# Patient Record
Sex: FEMALE | Race: WHITE | NOT HISPANIC OR LATINO | Employment: FULL TIME | URBAN - METROPOLITAN AREA
[De-identification: names, ages, dates, MRNs, and addresses within clinical notes are randomized per-mention and may not be internally consistent; named-entity substitution may affect disease eponyms.]

---

## 2017-11-28 ENCOUNTER — OFFICE VISIT (OUTPATIENT)
Dept: URGENT CARE | Facility: MEDICAL CENTER | Age: 22
End: 2017-11-28
Payer: COMMERCIAL

## 2017-11-29 NOTE — PROGRESS NOTES
Assessment    1  's permit physical examination (V70 3) (Z02 4)    Discussion/Summary  Discussion Summary:   Patient deemed fit for learners permit physical  paperwork signed, copy, original given back to patient  Chief Complaint  Chief Complaint Free Text Note Form: Here for  permit physical      History of Present Illness  HPI: Patient presents for a loner permit physical  She has no complaints at this time  No acute distress  Hospital Based Practices Required Assessment:  Pain Assessment  the patient states they do not have pain  Abuse And Domestic Violence Screen   Yes, the patient is safe at home  -- The patient states no one is hurting them  Depression And Suicide Screen  No, the patient has not had thoughts of hurting themself  No, the patient has not felt depressed in the past 7 days  Prefered Language is  english  Primary Language is  english  Readiness To Learn: Receptive  Barriers To Learning: none  Preferred Learning: verbal      Review of Systems  Focused-Female:  Constitutional: no fever-- and-- no chills  ENT: no nosebleeds-- and-- no nasal discharge  Cardiovascular: no chest pain-- and-- no palpitations  Respiratory: no shortness of breath,-- no cough-- and-- no wheezing  Gastrointestinal: no abdominal pain,-- no nausea,-- no vomiting-- and-- no diarrhea  ROS Reviewed:   ROS reviewed  Active Problems  1  Birth control (V25 9) (Z30 9)    Past Medical History  Active Problems And Past Medical History Reviewed: The active problems and past medical history were reviewed and updated today  Family History  Father    1  Family history of Back pain  Family History Reviewed: The family history was reviewed and updated today  Social History   · Former smoker (C31 17) (O39 037)  Social History Reviewed: The social history was reviewed and updated today  The social history was reviewed and is unchanged  Surgical History  Surgical History Reviewed:    The surgical history was reviewed and updated today  Current Meds   1  No Reported Medications Recorded  Medication List Reviewed: The medication list was reviewed and updated today  Allergies  1  No Known Drug Allergies    2  No Known Environmental Allergies   3  No Known Food Allergies    Vitals  Signs   Recorded: [id number] 10:37AM   Temperature: 97 8 F  Heart Rate: 71  Respiration: 18  Systolic: 306  Diastolic: 59  Weight: 323 lb   O2 Saturation: 100  Pain Scale: 0    Physical Exam   Constitutional  General appearance: No acute distress, well appearing and well nourished  Eyes  Conjunctiva and lids: No swelling, erythema or discharge  Pupils and irises: Equal, round and reactive to light  Ears, Nose, Mouth, and Throat  External inspection of ears and nose: Normal    Otoscopic examination: Tympanic membranes translucent with normal light reflex  Canals patent without erythema  Nasal mucosa, septum, and turbinates: Normal without edema or erythema  Oropharynx: Normal with no erythema, edema, exudate or lesions  Pulmonary  Respiratory effort: No increased work of breathing or signs of respiratory distress  Auscultation of lungs: Clear to auscultation  Cardiovascular  Palpation of heart: Normal PMI, no thrills  Auscultation of heart: Normal rate and rhythm, normal S1 and S2, without murmurs  Examination of extremities for edema and/or varicosities: Normal    Abdomen  Abdomen: Non-tender, no masses  Lymphatic  Palpation of lymph nodes in neck: No lymphadenopathy  Musculoskeletal  Gait and station: Normal    Inspection/palpation of joints, bones, and muscles: Normal    Skin  Skin and subcutaneous tissue: Normal without rashes or lesions  Neurologic  Reflexes: 2+ and symmetric  Sensation: No sensory loss     Psychiatric  Orientation to person, place, and time: Normal    Mood and affect: Normal        Signatures   Electronically signed by : Drea Browne [facility]; Nov 28 2017 10:59AM EST (Author)    Electronically signed by : ADALBERTO Damon ; Nov 28 2017 11:24AM EST

## 2021-12-05 ENCOUNTER — APPOINTMENT (OUTPATIENT)
Dept: RADIOLOGY | Facility: MEDICAL CENTER | Age: 26
End: 2021-12-05

## 2021-12-05 ENCOUNTER — APPOINTMENT (OUTPATIENT)
Dept: URGENT CARE | Facility: MEDICAL CENTER | Age: 26
End: 2021-12-05
Payer: OTHER MISCELLANEOUS

## 2021-12-05 DIAGNOSIS — S99.921A FOOT INJURY, RIGHT, INITIAL ENCOUNTER: Primary | ICD-10-CM

## 2021-12-05 DIAGNOSIS — S99.921A FOOT INJURY, RIGHT, INITIAL ENCOUNTER: ICD-10-CM

## 2021-12-05 PROCEDURE — G0383 LEV 4 HOSP TYPE B ED VISIT: HCPCS | Performed by: FAMILY MEDICINE

## 2021-12-05 PROCEDURE — 99284 EMERGENCY DEPT VISIT MOD MDM: CPT | Performed by: FAMILY MEDICINE

## 2021-12-05 PROCEDURE — 73630 X-RAY EXAM OF FOOT: CPT

## 2021-12-08 ENCOUNTER — APPOINTMENT (OUTPATIENT)
Dept: URGENT CARE | Facility: MEDICAL CENTER | Age: 26
End: 2021-12-08
Payer: OTHER MISCELLANEOUS

## 2021-12-08 PROCEDURE — 99213 OFFICE O/P EST LOW 20 MIN: CPT | Performed by: PHYSICIAN ASSISTANT

## 2021-12-15 ENCOUNTER — APPOINTMENT (OUTPATIENT)
Dept: URGENT CARE | Facility: MEDICAL CENTER | Age: 26
End: 2021-12-15
Payer: OTHER MISCELLANEOUS

## 2021-12-15 PROCEDURE — 99213 OFFICE O/P EST LOW 20 MIN: CPT | Performed by: PHYSICIAN ASSISTANT

## 2021-12-28 ENCOUNTER — TELEPHONE (OUTPATIENT)
Dept: URGENT CARE | Facility: MEDICAL CENTER | Age: 26
End: 2021-12-28

## 2022-01-07 ENCOUNTER — APPOINTMENT (OUTPATIENT)
Dept: URGENT CARE | Facility: MEDICAL CENTER | Age: 27
End: 2022-01-07
Payer: OTHER MISCELLANEOUS

## 2022-01-07 PROCEDURE — 99213 OFFICE O/P EST LOW 20 MIN: CPT | Performed by: PHYSICIAN ASSISTANT

## 2022-03-09 ENCOUNTER — APPOINTMENT (OUTPATIENT)
Dept: URGENT CARE | Facility: MEDICAL CENTER | Age: 27
End: 2022-03-09
Payer: OTHER MISCELLANEOUS

## 2022-03-09 PROCEDURE — 99212 OFFICE O/P EST SF 10 MIN: CPT | Performed by: PHYSICIAN ASSISTANT

## 2022-03-16 ENCOUNTER — APPOINTMENT (OUTPATIENT)
Dept: URGENT CARE | Facility: MEDICAL CENTER | Age: 27
End: 2022-03-16
Payer: OTHER MISCELLANEOUS

## 2022-03-16 PROCEDURE — 99213 OFFICE O/P EST LOW 20 MIN: CPT | Performed by: EMERGENCY MEDICINE

## 2022-03-30 ENCOUNTER — APPOINTMENT (OUTPATIENT)
Dept: URGENT CARE | Facility: MEDICAL CENTER | Age: 27
End: 2022-03-30
Payer: OTHER MISCELLANEOUS

## 2022-03-30 PROCEDURE — 99213 OFFICE O/P EST LOW 20 MIN: CPT | Performed by: PHYSICIAN ASSISTANT

## 2022-04-21 ENCOUNTER — APPOINTMENT (OUTPATIENT)
Dept: URGENT CARE | Facility: MEDICAL CENTER | Age: 27
End: 2022-04-21
Payer: OTHER MISCELLANEOUS

## 2022-04-21 ENCOUNTER — TELEPHONE (OUTPATIENT)
Dept: OBGYN CLINIC | Facility: HOSPITAL | Age: 27
End: 2022-04-21

## 2022-04-21 PROCEDURE — 99213 OFFICE O/P EST LOW 20 MIN: CPT | Performed by: FAMILY MEDICINE

## 2022-04-21 NOTE — TELEPHONE ENCOUNTER
Kermit Apple from [address] [facility] called in regard to crush injury that occurred 12/5/21  X-rays negative for fracture and MRI with no significant findings  Kermit Apple stated that she will send patient to Podiatry

## 2022-04-21 NOTE — TELEPHONE ENCOUNTER
Oli Mayorga called in from [address] [place] [address] Way wanting to make an appointment for this patient but was not able to answer COVID questions or know if the patient had surgery   I advised that would reflect on how we would have to schedule she is going to reach out to the patient and have her contact our office or have 3 way to make the appointment

## 2022-05-18 ENCOUNTER — OFFICE VISIT (OUTPATIENT)
Dept: PODIATRY | Facility: CLINIC | Age: 27
End: 2022-05-18
Payer: OTHER MISCELLANEOUS

## 2022-05-18 VITALS — HEIGHT: 60 IN | WEIGHT: 164 LBS | BODY MASS INDEX: 32.2 KG/M2

## 2022-05-18 DIAGNOSIS — G62.9 PERIPHERAL NERVE DISORDER: Primary | ICD-10-CM

## 2022-05-18 PROCEDURE — 99203 OFFICE O/P NEW LOW 30 MIN: CPT | Performed by: PODIATRIST

## 2022-05-18 RX ORDER — GABAPENTIN 300 MG/1
300 CAPSULE ORAL 2 TIMES DAILY
Qty: 60 CAPSULE | Refills: 0 | Status: SHIPPED | OUTPATIENT
Start: 2022-05-18 | End: 2022-06-17

## 2022-05-19 ENCOUNTER — TELEPHONE (OUTPATIENT)
Dept: PODIATRY | Facility: CLINIC | Age: 27
End: 2022-05-19

## 2022-05-19 NOTE — TELEPHONE ENCOUNTER
Can we please fax over this patient's appointment notes to Marly Dent at [facility]? She also needs a work note, with any restrictions sent as well      Fax:  [phone]

## 2022-05-19 NOTE — PROGRESS NOTES
Assessment/Plan:    Explained to patient that her current symptoms are most consistent with peripheral neuropathy of the right foot  Patient told to continue with physical therapy  An appropriate prescription was given  Patient also placed on gabapentin 300 mg b i d   Patient told to take the medication on a daily basis before bed for the 1st 2 weeks and assess her response  She can continue with the daily dosage if she finds that helpful otherwise she should increase to b i d  after 2 weeks  She will be reassessed in 4 weeks  No problem-specific Assessment & Plan notes found for this encounter  Diagnoses and all orders for this visit:    Peripheral nerve disorder  -     Ambulatory referral to Physical Therapy; Future  -     gabapentin (Neurontin) 300 mg capsule; Take 1 capsule (300 mg total) by mouth in the morning and 1 capsule (300 mg total) in the evening  Subjective:      Patient ID: Susan Thomson is a 33 y o  female  HPI     Patient, a 28-year-old female presents for assessment of her right foot  In December of 2021, patient suffered an injury at work  She states that her right foot was crushed by a forklift  X-rays taken at that time were read as negative for fracture  Subsequent MRIs and bone scans have been taken and were all read as negative for significant osseous pathology  Patient states that her right foot at times feels comfortable but with extended walking or standing she gets a variety of peculiar sensations in the right forefoot  This includes tingling, numbness, burning and throbbing  At times it keeps her awake at night  Patient has taken no medication for her disorder  She has not worked since the injury  She is currently undergoing physical therapy and has been given a TENS  unit which she finds helpful  I personally reviewed x-rays of the right foot dated 12/05/2021  It was negative for osseous pathology      The following portions of the patient's history were reviewed and updated as appropriate: allergies, current medications, past family history, past medical history, past social history, past surgical history and problem list     Review of Systems   Neurological: Positive for numbness  Paresthesia right foot   Psychiatric/Behavioral: Negative  Objective:      Ht 5' (1 524 m)   Wt 74 4 kg (164 lb)   BMI 32 03 kg/m²          Physical Exam  Constitutional:       Appearance: Normal appearance  Cardiovascular:      Pulses: Normal pulses  Musculoskeletal:         General: Tenderness present  Comments: Pain with palpation right 3rd, 4th and 5th metatarsal shafts  Pain with palpation 3rd, 4th interspaces right foot  Skin:     Comments: Temperature and turgor right foot within normal limits  No edema or ecchymosis noted  Neurological:      General: No focal deficit present  Mental Status: She is oriented to person, place, and time  Sensory: Sensory deficit present  Comments: Sensorium intact per [place] monofilament but diminished along the plantar lateral aspect right foot

## 2022-05-19 NOTE — TELEPHONE ENCOUNTER
Ashley Suarez called, she asked if I would fax over the new order for physical therapy to the [place] location    I faxed it over to [phone]

## 2022-05-25 ENCOUNTER — TELEPHONE (OUTPATIENT)
Dept: PODIATRY | Facility: CLINIC | Age: 27
End: 2022-05-25

## 2022-05-25 NOTE — TELEPHONE ENCOUNTER
Patient sees Dr Yoshi Toribio is calling in from [facility] wanting to get the office notes from 5/18 faxed over to her at [phone]

## 2022-05-25 NOTE — TELEPHONE ENCOUNTER
Kristina from [employer] called  She did get the notes from [facility] 5/18/2022 appointment  It does not specify the work status  This will need to be included in every office note for her disability  Please fax a note addressing the work status from 5/18/22

## 2022-05-31 ENCOUNTER — TELEPHONE (OUTPATIENT)
Dept: PODIATRY | Facility: CLINIC | Age: 27
End: 2022-05-31

## 2022-05-31 NOTE — TELEPHONE ENCOUNTER
Pt returned call and states that she does agree and that she would like to be out of work until at least her next scheduled evaluation  Letter generated and will have Dr Soha Erwin sign upon his return to the office this afternoon

## 2022-05-31 NOTE — TELEPHONE ENCOUNTER
Edy Hoffmann DPM  You 7 days ago         This patient has not worked in a long time--ever since her injury  Jacquenette Severance she's had a breakthrough in regards to pain, doubt that she is able to return at this time   I saw her for the 1st time approx  2 weeks ago, Jasvir Pina reassess ay next visit which is probably in 2 weeks      LMOM to request a return call from patient to inquire if she indeed is wanting to stay out of work until her next return visit on 6/15/22  Will await return call to write letter to send to [facility]

## 2022-05-31 NOTE — TELEPHONE ENCOUNTER
LMOM to request a return call from patient to inquire if she indeed is wanting to stay out of work until her next return visit on 6/15/22  Will await return call to write letter to send to [facility]

## 2022-05-31 NOTE — TELEPHONE ENCOUNTER
Pt returned call and states that she does agree and that she would like to be out of work until at least her next scheduled evaluation  Letter generated and will have Dr Divina Sandy sign upon his return to the office this afternoon

## 2022-06-15 ENCOUNTER — TELEPHONE (OUTPATIENT)
Dept: PODIATRY | Facility: CLINIC | Age: 27
End: 2022-06-15

## 2022-06-15 ENCOUNTER — OFFICE VISIT (OUTPATIENT)
Dept: PODIATRY | Facility: CLINIC | Age: 27
End: 2022-06-15
Payer: OTHER MISCELLANEOUS

## 2022-06-15 VITALS
DIASTOLIC BLOOD PRESSURE: 76 MMHG | HEIGHT: 60 IN | SYSTOLIC BLOOD PRESSURE: 116 MMHG | BODY MASS INDEX: 31.92 KG/M2 | WEIGHT: 162.6 LBS

## 2022-06-15 DIAGNOSIS — G62.9 PERIPHERAL NERVE DISORDER: Primary | ICD-10-CM

## 2022-06-15 PROCEDURE — 99212 OFFICE O/P EST SF 10 MIN: CPT | Performed by: PODIATRIST

## 2022-06-15 RX ORDER — DULOXETIN HYDROCHLORIDE 30 MG/1
30 CAPSULE, DELAYED RELEASE ORAL 2 TIMES DAILY
Qty: 60 CAPSULE | Refills: 1 | Status: SHIPPED | OUTPATIENT
Start: 2022-06-15 | End: 2022-08-14

## 2022-06-15 NOTE — TELEPHONE ENCOUNTER
Kristina from [employer] called  She requested the notes from this patient's visit today  Fax:  [phone]    Thank you!

## 2022-06-15 NOTE — LETTER
Genna 15, 2022     Patient: Bobbi Sims  YOB: 1995  Date of Visit: 6/15/2022      To Whom it May Concern:    Stacey Hernandez is under my professional care  Paulino Diaz was seen in my office on 6/15/2022  Meaghanbethany Diaz may return to work on at an unknown date to to right foot injury  If you have any questions or concerns, please don't hesitate to call           Sincerely,          Kandace Sousa DPM        CC: No Recipients

## 2022-06-21 ENCOUNTER — TELEPHONE (OUTPATIENT)
Dept: PODIATRY | Facility: CLINIC | Age: 27
End: 2022-06-21

## 2022-06-21 NOTE — TELEPHONE ENCOUNTER
I received a call from her nurse  with Work[employer]'s Compensation  They need a work status letter stating if Jalil Das can go back to light duty or full duty and the effective date  Please fax to [facility] at [phone]    Any questions, please call [phone]

## 2022-06-23 NOTE — TELEPHONE ENCOUNTER
Spoke to patient to inquire about how she is feeling and what she thinks about returning to work at this point  She states that since starting the Cymbalta shes had an increase of foot pain  When she was at PT earlier this week they actually scaled back her exercises because she was in so much pain  They will be keeping an eye on her pain level in the next few weeks  Will send this to Dr Nicholas Parra as an Xiao Theodore and to obtain confirmation that it is OK to write the patient out for the remainder of the time until her next in office evaluation

## 2022-06-29 ENCOUNTER — TELEPHONE (OUTPATIENT)
Dept: PODIATRY | Facility: CLINIC | Age: 27
End: 2022-06-29

## 2022-06-29 NOTE — TELEPHONE ENCOUNTER
Letter generated and faxed to [facility] as requested  Confirmation received

## 2022-06-29 NOTE — TELEPHONE ENCOUNTER
Kristina from [employer] called     She also needs work status notes  Can she return to light duty? Please fax these over to [facility]    Fax:  [phone]    Thank you

## 2022-07-27 ENCOUNTER — TELEPHONE (OUTPATIENT)
Dept: PODIATRY | Facility: CLINIC | Age: 27
End: 2022-07-27

## 2022-07-27 ENCOUNTER — OFFICE VISIT (OUTPATIENT)
Dept: PODIATRY | Facility: CLINIC | Age: 27
End: 2022-07-27
Payer: OTHER MISCELLANEOUS

## 2022-07-27 VITALS
HEIGHT: 60 IN | WEIGHT: 151.6 LBS | BODY MASS INDEX: 29.76 KG/M2 | SYSTOLIC BLOOD PRESSURE: 118 MMHG | DIASTOLIC BLOOD PRESSURE: 62 MMHG

## 2022-07-27 DIAGNOSIS — G62.9 PERIPHERAL NERVE DISORDER: Primary | ICD-10-CM

## 2022-07-27 PROCEDURE — 99213 OFFICE O/P EST LOW 20 MIN: CPT | Performed by: PODIATRIST

## 2022-07-27 RX ORDER — DULOXETIN HYDROCHLORIDE 30 MG/1
30 CAPSULE, DELAYED RELEASE ORAL DAILY
Qty: 90 CAPSULE | Refills: 1 | Status: SHIPPED | OUTPATIENT
Start: 2022-07-27 | End: 2023-01-23

## 2022-07-27 NOTE — TELEPHONE ENCOUNTER
Kristina from [employer] called  She needs the appointment notes and this patient's work status      Can we please fax these to:    [phone] attn: Giselle Samson

## 2022-07-27 NOTE — LETTER
July 27, 2022     Patient: Berry Stapleton  YOB: 1995  Date of Visit: 7/27/2022      To Whom it May Concern:    Magy Miller is under my professional care  Gia Landers was seen in my office on 7/27/2022  Gia Landers may return to work on 8/15/22 but only working 4 hours daily until re-assessed in 6 weeks  If you have any questions or concerns, please don't hesitate to call           Sincerely,          Val Fernando DPM        CC: No Recipients

## 2022-07-27 NOTE — PROGRESS NOTES
Assessment/Plan:    Patient is doing much better than she had been  She feels that she can return to work but only part-time  She was given a note to return to work on August 15th for 4 hours daily  She was given a prescription to continue with physical therapy at [facility]  Cymbalta was prescribed 30 mg daily for 3 months with 1 refill  She will be reassessed in 6 weeks  No problem-specific Assessment & Plan notes found for this encounter  Diagnoses and all orders for this visit:    Peripheral nerve disorder  -     DULoxetine (Cymbalta) 30 mg delayed release capsule; Take 1 capsule (30 mg total) by mouth daily          Subjective:      Patient ID: Giovani Persaud is a 33 y o  female  HPI     Patient presents for assessment of right foot  Patient suffered a crush injury of the right foot in December of 2021  She has not worked since the injury due to burning, tingling and periods of numbness in the foot  Patient's job involves utilizing a [employer]  Patient is undergoing physical therapy and finds it very helpful  At last visit, patient was prescribed Cymbalta 30 mg b i d  for symptoms of peripheral neuropathy  She states that she can take the medication on a daily basis without GI upset but has GI issues with increased dosing  Nevertheless, she feels that the medication has been very helpful  She is much more comfortable when she takes it  She states that she can now drive although she does have difficulty at times everting the right foot  Reviewed notes from Physical therapy dated 07/27/2022  This indicated pain in the dorsum of the right foot  The following portions of the patient's history were reviewed and updated as appropriate: allergies, current medications, past family history, past medical history, past social history, past surgical history and problem list     Review of Systems   Gastrointestinal: Negative  Musculoskeletal: Positive for gait problem  Neurological: Positive for numbness  Psychiatric/Behavioral: Negative  Objective:      /62   Ht 5' (1 524 m)   Wt 68 8 kg (151 lb 9 6 oz)   BMI 29 61 kg/m²          Physical Exam  Constitutional:       Appearance: Normal appearance  Cardiovascular:      Pulses: Normal pulses  Musculoskeletal:         General: Tenderness present  Comments: Mild pain with palpation dorsum right foot at 3rd metatarsal shaft  Slight weakness with eversion right foot  Skin:     General: Skin is warm  Neurological:      General: No focal deficit present  Mental Status: She is oriented to person, place, and time

## 2022-08-15 ENCOUNTER — TELEPHONE (OUTPATIENT)
Dept: PODIATRY | Facility: CLINIC | Age: 27
End: 2022-08-15

## 2022-09-07 ENCOUNTER — OFFICE VISIT (OUTPATIENT)
Dept: PODIATRY | Facility: CLINIC | Age: 27
End: 2022-09-07
Payer: OTHER MISCELLANEOUS

## 2022-09-07 ENCOUNTER — TELEPHONE (OUTPATIENT)
Dept: PODIATRY | Facility: CLINIC | Age: 27
End: 2022-09-07

## 2022-09-07 VITALS
BODY MASS INDEX: 28.07 KG/M2 | DIASTOLIC BLOOD PRESSURE: 64 MMHG | WEIGHT: 143 LBS | SYSTOLIC BLOOD PRESSURE: 116 MMHG | HEIGHT: 60 IN

## 2022-09-07 DIAGNOSIS — G62.9 PERIPHERAL NERVE DISORDER: Primary | ICD-10-CM

## 2022-09-07 PROCEDURE — 99212 OFFICE O/P EST SF 10 MIN: CPT | Performed by: PODIATRIST

## 2022-09-07 NOTE — LETTER
September 7, 2022     Patient: Valeri Hurley  YOB: 1995  Date of Visit: 9/7/2022      To Whom it May Concern:    Teetee Santiago is under my professional care  Tommy Freedman was seen in my office on 9/7/2022  Tommyosiris Freedman may return to work with limitations : May work 4hours daily followed by work hardening for 4 hours until next appointment in 3 months  If you have any questions or concerns, please don't hesitate to call           Sincerely,          Chelsie Garcia DPM        CC: No Recipients

## 2022-09-07 NOTE — PROGRESS NOTES
Patient presents for assessment of right foot  Patient notes that she has not returned to work as of yet due to difficulties with the HR department  She relates that her foot is reasonably comfortable but she is uncertain as to whether she can return to work on a full-time basis  She desires to begin work at 4 hours and then have work hardening physical therapy which would simulate her job demands  This is recommended until she is seen again in 3 months  She should continue with Cymbalta daily basis  She is rescheduled for 3 months

## 2022-09-07 NOTE — TELEPHONE ENCOUNTER
At the request of Kristina with [employer] [facility], I faxed over the treatment notes and work status notes from today's appointment

## 2022-09-13 ENCOUNTER — TELEPHONE (OUTPATIENT)
Dept: PODIATRY | Facility: CLINIC | Age: 27
End: 2022-09-13

## 2022-09-13 NOTE — TELEPHONE ENCOUNTER
I received a call from [place] with [facility]  Before they can order the work hardening physical therapy  They need an order for it faxed over to them  Can an order be put in and faxed to [facility]?

## 2022-09-14 ENCOUNTER — TELEPHONE (OUTPATIENT)
Dept: PODIATRY | Facility: CLINIC | Age: 27
End: 2022-09-14

## 2022-09-14 DIAGNOSIS — G62.9 PERIPHERAL NERVE DISORDER: ICD-10-CM

## 2022-09-14 DIAGNOSIS — M76.60 ACHILLES TENDINITIS, UNSPECIFIED LATERALITY: Primary | ICD-10-CM

## 2022-09-15 NOTE — TELEPHONE ENCOUNTER
LMOM for Kristina requesting a return call to confirm her fax number as I attempted the one list and it was not correct  When [place] returns call, please just obtain the correct fax number to be used

## 2022-09-19 ENCOUNTER — TELEPHONE (OUTPATIENT)
Dept: PODIATRY | Facility: CLINIC | Age: 27
End: 2022-09-19

## 2022-09-19 DIAGNOSIS — G62.9 PERIPHERAL NERVE DISORDER: Primary | ICD-10-CM

## 2022-09-19 NOTE — TELEPHONE ENCOUNTER
Please fax to [facility] the work hardening referral  Pt referral that says work hardening on it  [phone]      Thank you

## 2022-09-19 NOTE — TELEPHONE ENCOUNTER
[facility] from [facility] called and requested Minister[facility]'s order for PT be faxed to her - Done

## 2022-12-14 ENCOUNTER — OFFICE VISIT (OUTPATIENT)
Dept: PODIATRY | Facility: CLINIC | Age: 27
End: 2022-12-14

## 2022-12-14 VITALS
DIASTOLIC BLOOD PRESSURE: 50 MMHG | WEIGHT: 124.4 LBS | HEIGHT: 60 IN | SYSTOLIC BLOOD PRESSURE: 100 MMHG | BODY MASS INDEX: 24.42 KG/M2

## 2022-12-14 DIAGNOSIS — G62.9 PERIPHERAL NERVE DISORDER: Primary | ICD-10-CM

## 2022-12-14 RX ORDER — DULOXETIN HYDROCHLORIDE 30 MG/1
30 CAPSULE, DELAYED RELEASE ORAL DAILY
Qty: 90 CAPSULE | Refills: 0 | Status: SHIPPED | OUTPATIENT
Start: 2022-12-14 | End: 2023-03-14

## 2022-12-14 NOTE — PROGRESS NOTES
Patient presents for assessment of right foot  Patient suffered a crush injury which led to peripheral neuropathy over 1 year ago  Patient still has not returned to work even though she was given an okay to return for 4 hours  She states that the company HR has not ever gotten back to her  Patient states that she always has a level of pain of a 2 out of 10  This level increases dependent upon exertion and weather changes  She is still going to physical therapy  She states that medical marijuana has also been introduced  She takes Cymbalta every 1 to 2 days but was wary of mixing this with the medical marijuana  Patient is agreeable to returning to work for 4 hours but suspects that she cannot work any longer  Advised patient to return to the Cymbalta 30 mg daily  Another 90-day prescription was provided  As far as anticipating improvement in the right foot, it is unlikely  Patient told to consider finding a new job, 1 that is less strenuous  No additional treatment needed today  Reappoint prn

## 2022-12-16 ENCOUNTER — TELEPHONE (OUTPATIENT)
Dept: PODIATRY | Facility: CLINIC | Age: 27
End: 2022-12-16

## 2022-12-16 NOTE — TELEPHONE ENCOUNTER
Caller: Kristina/RN/Work comp    Doctor/Office: Dr Gerard Barton    #: [phone]    Escalation: Last office visit/gave letter to pt stating she needs to find another job--job needs letter stating why she can no longer do this job and the permanent restrictions she will have in place for the rest of her life, as future employers will need to know this as well before hiring  Please fax letter to [facility] @ [phone]  Asking for details like how much she can carry/lift/bending/squatting etc  Call back with any questions   Thanks

## 2023-02-16 NOTE — PROGRESS NOTES
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